# Patient Record
Sex: MALE | Race: WHITE
[De-identification: names, ages, dates, MRNs, and addresses within clinical notes are randomized per-mention and may not be internally consistent; named-entity substitution may affect disease eponyms.]

---

## 2019-01-04 ENCOUNTER — HOSPITAL ENCOUNTER (EMERGENCY)
Dept: HOSPITAL 4 - SED | Age: 59
Discharge: HOME | End: 2019-01-04
Payer: OTHER GOVERNMENT

## 2019-01-04 VITALS — WEIGHT: 185 LBS | HEIGHT: 71 IN | BODY MASS INDEX: 25.9 KG/M2

## 2019-01-04 VITALS — SYSTOLIC BLOOD PRESSURE: 140 MMHG
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
## 2019-01-04 DIAGNOSIS — S01.01XA: Primary | ICD-10-CM

## 2019-01-04 PROCEDURE — 70450 CT HEAD/BRAIN W/O DYE: CPT

## 2019-01-04 PROCEDURE — 12002 RPR S/N/AX/GEN/TRNK2.6-7.5CM: CPT

## 2019-01-04 PROCEDURE — 99284 EMERGENCY DEPT VISIT MOD MDM: CPT

## 2019-01-04 NOTE — NUR
Patient given written and verbal discharge instructions and verbalizes 
understanding.  ER MD discussed with patient the results and treatment 
provided. Patient in stable condition. ID arm band removed. No Rx given. 
Patient educated on pain management and to follow up with PMD. Pain Scale 0/10.

Opportunity for questions provided and answered. Medication side effect fact 
sheet provided.

## 2019-01-04 NOTE — NUR
Pt placed to ER bed 03. Lac and abrasion to top of head s/p glass brick falling 
3.5" from a shelf. 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 
a shelf, he bumped shelves with shoulder causing brick to to fall onto head. 2 
longitudinal lacs to head. Anterior lac superficial. Posterior lac with 
underlying tissues visible, both measuring approx 2 cm in length. Pt denies 
LOC, no N/V, no headache, no drainage from ears or nose, no H/A, AAOx4. 
Bleeding controlled with pressure dsg.

## 2019-01-17 ENCOUNTER — HOSPITAL ENCOUNTER (EMERGENCY)
Dept: HOSPITAL 4 - SED | Age: 59
Discharge: HOME | End: 2019-01-17
Payer: OTHER GOVERNMENT

## 2019-01-17 VITALS — SYSTOLIC BLOOD PRESSURE: 121 MMHG

## 2019-01-17 VITALS — BODY MASS INDEX: 25.9 KG/M2 | HEIGHT: 71 IN | WEIGHT: 185 LBS
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
## 2019-01-17 DIAGNOSIS — S01.01XD: Primary | ICD-10-CM

## 2019-01-17 DIAGNOSIS — R03.0: ICD-10-CM
